# Patient Record
Sex: FEMALE | Race: BLACK OR AFRICAN AMERICAN | URBAN - METROPOLITAN AREA
[De-identification: names, ages, dates, MRNs, and addresses within clinical notes are randomized per-mention and may not be internally consistent; named-entity substitution may affect disease eponyms.]

---

## 2023-07-26 DIAGNOSIS — H40.9 GLAUCOMA, UNSPECIFIED GLAUCOMA TYPE, UNSPECIFIED LATERALITY: Primary | ICD-10-CM

## 2023-11-17 ENCOUNTER — OFFICE VISIT (OUTPATIENT)
Dept: OPHTHALMOLOGY | Facility: CLINIC | Age: 33
End: 2023-11-17
Payer: MEDICAID

## 2023-11-17 DIAGNOSIS — H52.13 MYOPIA OF BOTH EYES: Primary | ICD-10-CM

## 2023-11-17 DIAGNOSIS — H40.9 GLAUCOMA, UNSPECIFIED GLAUCOMA TYPE, UNSPECIFIED LATERALITY: ICD-10-CM

## 2023-11-17 PROCEDURE — 92133 CPTRZD OPH DX IMG PST SGM ON: CPT | Mod: PBBFAC,PN | Performed by: OPHTHALMOLOGY

## 2023-11-17 PROCEDURE — 99213 OFFICE O/P EST LOW 20 MIN: CPT | Mod: PBBFAC,PN

## 2023-11-17 RX ORDER — PRAVASTATIN SODIUM 20 MG/1
20 TABLET ORAL
COMMUNITY
Start: 2023-06-23

## 2023-11-17 RX ORDER — ATORVASTATIN CALCIUM 10 MG/1
10 TABLET, FILM COATED ORAL
COMMUNITY
Start: 2023-10-04

## 2023-11-17 RX ORDER — CLONIDINE HYDROCHLORIDE 0.1 MG/1
TABLET ORAL
COMMUNITY
Start: 2023-10-04

## 2023-11-17 RX ORDER — LATANOPROST 50 UG/ML
1 SOLUTION/ DROPS OPHTHALMIC NIGHTLY
COMMUNITY
Start: 2023-07-20

## 2023-11-17 RX ORDER — METFORMIN HYDROCHLORIDE 750 MG/1
750 TABLET, EXTENDED RELEASE ORAL
COMMUNITY
Start: 2023-10-04

## 2023-11-17 RX ORDER — OMEPRAZOLE 40 MG/1
40 CAPSULE, DELAYED RELEASE ORAL
COMMUNITY
Start: 2023-11-13

## 2023-11-17 RX ORDER — TRANEXAMIC ACID 650 MG/1
650 TABLET ORAL EVERY 8 HOURS PRN
COMMUNITY
Start: 2023-06-16

## 2023-11-17 RX ORDER — LISINOPRIL 20 MG/1
20 TABLET ORAL
COMMUNITY
Start: 2023-02-24

## 2023-11-17 RX ORDER — PHENTERMINE HYDROCHLORIDE 37.5 MG/1
37.5 TABLET ORAL
COMMUNITY
Start: 2023-10-20

## 2023-11-17 RX ORDER — PRAVASTATIN SODIUM 40 MG/1
40 TABLET ORAL
COMMUNITY
Start: 2023-11-06

## 2023-11-17 RX ORDER — AMLODIPINE BESYLATE 10 MG/1
10 TABLET ORAL
COMMUNITY

## 2023-11-17 RX ORDER — VALSARTAN 160 MG/1
160 TABLET ORAL
COMMUNITY
Start: 2023-05-12

## 2023-11-17 RX ORDER — ATORVASTATIN CALCIUM 20 MG/1
20 TABLET, FILM COATED ORAL
COMMUNITY
Start: 2023-06-20

## 2023-11-17 RX ORDER — METFORMIN HYDROCHLORIDE 500 MG/1
500 TABLET ORAL
COMMUNITY
Start: 2023-02-24

## 2023-11-17 RX ORDER — SIMVASTATIN 20 MG/1
20 TABLET, FILM COATED ORAL
COMMUNITY
Start: 2023-02-24

## 2023-11-17 RX ORDER — HYDROXYZINE HYDROCHLORIDE 25 MG/1
25 TABLET, FILM COATED ORAL
COMMUNITY
Start: 2023-09-11

## 2023-11-17 RX ORDER — TRAZODONE HYDROCHLORIDE 50 MG/1
50 TABLET ORAL NIGHTLY PRN
COMMUNITY
Start: 2023-10-23

## 2023-11-17 RX ORDER — METFORMIN HYDROCHLORIDE 500 MG/1
TABLET, EXTENDED RELEASE ORAL
COMMUNITY
Start: 2023-05-23

## 2023-11-17 RX ORDER — METHIMAZOLE 5 MG/1
5 TABLET ORAL 2 TIMES DAILY
COMMUNITY
Start: 2023-10-09

## 2023-11-17 RX ORDER — VALSARTAN AND HYDROCHLOROTHIAZIDE 160; 25 MG/1; MG/1
1 TABLET ORAL
COMMUNITY
Start: 2023-10-16

## 2023-11-17 RX ORDER — IBUPROFEN 600 MG/1
600 TABLET ORAL
COMMUNITY
Start: 2023-11-06

## 2023-11-17 NOTE — PROGRESS NOTES
HPI     Blurred Vision     Additional comments: In both eyes but the pt states it worse in the right   eye. Pt states even when she has her glasses on it still blurry.           Eye Strain     Additional comments: Pt states she strains her eyes more when watching   tv.Pt states she had to lean forward a little to see.           Comments    Glaucoma, unspecified glaucoma type, unspecified laterality          Last edited by Bernice Najera MA on 11/17/2023 10:20 AM.            Assessment /Plan     OCT RNFL 11/17/23  OD: 102, all green  OS: 97, all green    Refractive error  - BCVA 20/20 OU, MRX 11/17/23  - CTM    Glaucoma suspect  - Negative family history  - increased c/d 0.45  - IOP 19//21  - OCT RNFL all green OU  - CTM

## 2024-06-21 ENCOUNTER — TELEPHONE (OUTPATIENT)
Dept: INTERNAL MEDICINE | Facility: CLINIC | Age: 34
End: 2024-06-21
Payer: MEDICAID

## 2025-01-27 ENCOUNTER — TELEPHONE (OUTPATIENT)
Dept: SURGERY | Facility: CLINIC | Age: 35
End: 2025-01-27

## 2025-04-09 NOTE — PROGRESS NOTES
"Initial Consult  Trina Clementina Terrazas  Chief Complaint   Patient presents with    Consult     Interested in VSG ([facility] Medicaid) Dr. ARRIAGA       History of Present Illness:  Patient is a 34 y.o. female who is referred for evaluation of surgical treatment of morbid obesity. Her Body mass index is 41.74 kg/m². She has known comorbidities of diabetes mellitus, GERD, hypertension, and obstructive sleep apnea. She has attended the bariatric seminar and is most interested in gastric sleeve surgery. The patient has had multiple unsuccessful diet attempts in the past without sustained weight loss. With multiple unsuccessful weight loss attempts, the patient believes they are ready for surgical intervention.     Denies any complaints or issues today for visit.    Labs (): need new ones    Menstrual cycle: irregular, monthly  Children: girl, 15 years old. No plans  Occupation: no     Anemia/easy brusing/bleeding - [] Yes   [x] No   Smoker - [] Yes   [x] No  [] Resolved  Hx of blood clots - [] Yes   [x] No     Ht: 5' 4.5" (1.638 m)   Weights:   Trend Weights BMI   Starting 247# 41   Pre-Op     2 Week     2 Month     6 Month      1 Year     2 Year               For Adults 20 Years and Older:  BMI Weight Status   Below 18.5 Underweight   18.6-24.9 Normal/Healthy   25.0-29.9 Overweight   30.0 & Above Obese     Ideal Weight Range for Your Height: 5'4" = 110 - 144 lbs     Pertinent History:  HTN - [x] Yes   [] No    [] Resolved  HLD - [] Yes   [x] No    [] Resolved  DM  -  [] Yes   [x] No    [] Resolved  SYEDA - [x] Yes   [] No   [] Resolved, does not wear CAPA  GERD - [x] Yes   [] No [] Resolved, controlled PPI   Anticoagulation- [] Yes   [x] No      If yes, type: [] ASA [] Plavix [] Other    Patient Care Team:  Lissett Sanchez NP as PCP - General (Family Medicine)  Anoop Ruiz MD as Consulting Physician (Bariatrics)  Maribel Michael MD as Diabetes Digital Medicine Responsible Provider (Urgent Care)  Maribel Michael MD " "as Hypertension Digital Medicine Responsible Provider (Urgent Care)  Octavio Pond II, MD (Cardiology)  Carrie Sherwood NP (Internal Medicine)     Subjective:     12 point review of systems conducted, negative except as stated in the history of present illness. See HPI for details.    Review of patient's allergies indicates:  No Known Allergies  Past Medical History:   Diagnosis Date    GERD (gastroesophageal reflux disease)     Glaucoma     HLD (hyperlipidemia)     Hypertension     Hypothyroidism     Insomnia     Insulin resistance     Migraines     Obesity, unspecified     Obstructive sleep apnea     Vitamin D deficiency      Past Surgical History:   Procedure Laterality Date    BIOPSY OF THYROID       SECTION       Family History   Problem Relation Name Age of Onset    Hypertension Mother Camilla Martins     Anemia Mother Camilla Martins     Diabetes Father Porfirio martins     Heart failure Father Porfirio martins     Hyperlipidemia Father Porfirio martins     Hypertension Father Porfirio martins     Sleep apnea Father Porfirio martins      Social History[1]   Current Outpatient Medications   Medication Instructions    amLODIPine (NORVASC) 10 mg    ergocalciferol, vitamin D2, (VITAMIN D ORAL) Take by mouth.    hydrOXYzine HCL (ATARAX) 25 mg     mg    latanoprost 0.005 % ophthalmic solution 1 drop, Nightly    metFORMIN (GLUCOPHAGE-XR) 750 mg    methIMAzole (TAPAZOLE) 5 mg, 2 times daily    omeprazole (PRILOSEC) 40 mg    valsartan (DIOVAN) 160 mg       Objective:     Vital Signs (Most Recent):  Visit Vitals  BP (!) 137/90   Pulse 74   Ht 5' 4.5" (1.638 m)   Wt 112 kg (247 lb)   BMI 41.74 kg/m²       Physical Exam:  General:  Alert and oriented. Obese  Respiratory:  Lungs are clear to auscultation, Respirations are non-labored, Breath sounds are equal.    Cardiovascular:  Normal rate, Regular rhythm, No murmur.    Gastrointestinal:  Soft, Non-tender, Non-distended, Normal bowel " sounds        Musculoskeletal:  Normal range of motion, Normal strength.    Integumentary:  Warm, Dry, Pink.    Neurologic:  Alert, Oriented.    Psychiatric:  Cooperative.      ASSESSMENT/PLAN:        1. Glaucoma, unspecified glaucoma type, unspecified laterality        2. Primary hypertension        3. Type 2 diabetes mellitus without complication, without long-term current use of insulin        4. Encounter for weight loss counseling        5. Encounter for pre-bariatric surgery counseling and education        6. Exercise counseling        7. Dietary counseling        8. Morbid obesity        9. Wellness examination  CBC Auto Differential    Comprehensive Metabolic Panel    Hemoglobin A1C    Lipid Panel    TSH          Plan:  Trina Terrazas is morbidly obese (Body mass index is 41.74 kg/m².) with significant weight related co-morbidity including: diabetes mellitus, GERD, hypertension, and obstructive sleep apnea. The patient has been unable to lose weight and improve their co-morbid conditions with medical management including diet and exercise. She understands that this is a tool and lifestyle change will be necessary to keep weight off.     RECOMMENDATION: Weight loss surgery. The risks, benefits, and alternatives, including gastric sleeve surgery (which the patient prefers) were discussed at length and all of their questions were answered. The patient appears to understand and wishes to proceed.     Risk of pregnancy discussed with pt's of child bearing age. Avoid pregnancy up to 18 months after bariatric procedure to maximize weight loss and avoid subsequent vitamin and mineral deficiencies and/or possible miscarriage due to low caloric intake.   - no birth control, would like another child.     The patient was given the following instructions:  H.Pylori breat test  Once H.Pylori back will need EGD set up  3 months of medically supervised weight loss visits with the dietitian  Getting [facility]  today.   will need clearance from Dr. Pond, cardiology   Needs psychological evaluations and clearance.  Must attend a preoperative bariatric class.  Discussion with pt regarding steroids and NSAIDs post surgery. Will not be able to use these after surgery due to risk of ulceration, perforation, esophagitis, gastritis, bleeding, etc. Pt verbalized understanding.   The patient clearly understood that surgery would only be scheduled if there were no medical or psychiatric contraindications and a second office visit is required.           [1]   Social History  Tobacco Use    Smoking status: Never    Smokeless tobacco: Never   Substance Use Topics    Alcohol use: Never    Drug use: Never

## 2025-04-10 ENCOUNTER — PATIENT MESSAGE (OUTPATIENT)
Dept: SURGERY | Facility: CLINIC | Age: 35
End: 2025-04-10

## 2025-04-10 ENCOUNTER — OFFICE VISIT (OUTPATIENT)
Dept: SURGERY | Facility: CLINIC | Age: 35
End: 2025-04-10
Payer: MEDICAID

## 2025-04-10 ENCOUNTER — CLINICAL SUPPORT (OUTPATIENT)
Dept: BARIATRICS | Facility: HOSPITAL | Age: 35
End: 2025-04-10

## 2025-04-10 ENCOUNTER — LAB VISIT (OUTPATIENT)
Dept: LAB | Facility: HOSPITAL | Age: 35
End: 2025-04-10
Attending: SURGERY
Payer: MEDICAID

## 2025-04-10 VITALS — HEIGHT: 65 IN | BODY MASS INDEX: 41.26 KG/M2 | WEIGHT: 247.63 LBS

## 2025-04-10 VITALS — BODY MASS INDEX: 41.83 KG/M2 | HEIGHT: 64 IN | WEIGHT: 245 LBS

## 2025-04-10 VITALS
DIASTOLIC BLOOD PRESSURE: 90 MMHG | HEART RATE: 74 BPM | SYSTOLIC BLOOD PRESSURE: 137 MMHG | BODY MASS INDEX: 41.15 KG/M2 | HEIGHT: 65 IN | WEIGHT: 247 LBS

## 2025-04-10 DIAGNOSIS — Z71.3 DIETARY COUNSELING: ICD-10-CM

## 2025-04-10 DIAGNOSIS — Z71.82 EXERCISE COUNSELING: ICD-10-CM

## 2025-04-10 DIAGNOSIS — E66.9 OBESITY: Primary | ICD-10-CM

## 2025-04-10 DIAGNOSIS — Z71.89 ENCOUNTER FOR PRE-BARIATRIC SURGERY COUNSELING AND EDUCATION: ICD-10-CM

## 2025-04-10 DIAGNOSIS — Z00.00 WELLNESS EXAMINATION: ICD-10-CM

## 2025-04-10 DIAGNOSIS — E66.01 MORBID OBESITY: ICD-10-CM

## 2025-04-10 DIAGNOSIS — Z71.3 ENCOUNTER FOR WEIGHT LOSS COUNSELING: ICD-10-CM

## 2025-04-10 DIAGNOSIS — E66.01 MORBID OBESITY WITH BMI OF 40.0-44.9, ADULT: Primary | ICD-10-CM

## 2025-04-10 DIAGNOSIS — I10 PRIMARY HYPERTENSION: ICD-10-CM

## 2025-04-10 DIAGNOSIS — H40.9 GLAUCOMA, UNSPECIFIED GLAUCOMA TYPE, UNSPECIFIED LATERALITY: Primary | ICD-10-CM

## 2025-04-10 DIAGNOSIS — E11.9 TYPE 2 DIABETES MELLITUS WITHOUT COMPLICATION, WITHOUT LONG-TERM CURRENT USE OF INSULIN: ICD-10-CM

## 2025-04-10 LAB
ALBUMIN SERPL-MCNC: 3.9 G/DL (ref 3.5–5)
ALBUMIN/GLOB SERPL: 1 RATIO (ref 1.1–2)
ALP SERPL-CCNC: 100 UNIT/L (ref 40–150)
ALT SERPL-CCNC: 11 UNIT/L (ref 0–55)
ANION GAP SERPL CALC-SCNC: 9 MEQ/L
AST SERPL-CCNC: 16 UNIT/L (ref 11–45)
BASOPHILS # BLD AUTO: 0.02 X10(3)/MCL
BASOPHILS NFR BLD AUTO: 0.3 %
BILIRUB SERPL-MCNC: 0.4 MG/DL
BUN SERPL-MCNC: 6.8 MG/DL (ref 7–18.7)
CALCIUM SERPL-MCNC: 9.3 MG/DL (ref 8.4–10.2)
CHLORIDE SERPL-SCNC: 102 MMOL/L (ref 98–107)
CHOLEST SERPL-MCNC: 282 MG/DL
CHOLEST/HDLC SERPL: 6 {RATIO} (ref 0–5)
CO2 SERPL-SCNC: 27 MMOL/L (ref 22–29)
CREAT SERPL-MCNC: 0.72 MG/DL (ref 0.55–1.02)
CREAT/UREA NIT SERPL: 9
EOSINOPHIL # BLD AUTO: 0.06 X10(3)/MCL (ref 0–0.9)
EOSINOPHIL NFR BLD AUTO: 0.9 %
ERYTHROCYTE [DISTWIDTH] IN BLOOD BY AUTOMATED COUNT: 12.5 % (ref 11.5–17)
EST. AVERAGE GLUCOSE BLD GHB EST-MCNC: 93.9 MG/DL
GFR SERPLBLD CREATININE-BSD FMLA CKD-EPI: >60 ML/MIN/1.73/M2
GLOBULIN SER-MCNC: 3.9 GM/DL (ref 2.4–3.5)
GLUCOSE SERPL-MCNC: 80 MG/DL (ref 74–100)
HBA1C MFR BLD: 4.9 %
HCT VFR BLD AUTO: 37.6 % (ref 37–47)
HDLC SERPL-MCNC: 45 MG/DL (ref 35–60)
HGB BLD-MCNC: 13 G/DL (ref 12–16)
IMM GRANULOCYTES # BLD AUTO: 0.05 X10(3)/MCL (ref 0–0.04)
IMM GRANULOCYTES NFR BLD AUTO: 0.8 %
LDLC SERPL CALC-MCNC: 216 MG/DL (ref 50–140)
LYMPHOCYTES # BLD AUTO: 2.15 X10(3)/MCL (ref 0.6–4.6)
LYMPHOCYTES NFR BLD AUTO: 33.6 %
MCH RBC QN AUTO: 30.1 PG (ref 27–31)
MCHC RBC AUTO-ENTMCNC: 34.6 G/DL (ref 33–36)
MCV RBC AUTO: 87 FL (ref 80–94)
MONOCYTES # BLD AUTO: 0.61 X10(3)/MCL (ref 0.1–1.3)
MONOCYTES NFR BLD AUTO: 9.5 %
NEUTROPHILS # BLD AUTO: 3.5 X10(3)/MCL (ref 2.1–9.2)
NEUTROPHILS NFR BLD AUTO: 54.9 %
NRBC BLD AUTO-RTO: 0 %
PLATELET # BLD AUTO: 303 X10(3)/MCL (ref 130–400)
PMV BLD AUTO: 9.8 FL (ref 7.4–10.4)
POTASSIUM SERPL-SCNC: 3.8 MMOL/L (ref 3.5–5.1)
PROT SERPL-MCNC: 7.8 GM/DL (ref 6.4–8.3)
RBC # BLD AUTO: 4.32 X10(6)/MCL (ref 4.2–5.4)
SODIUM SERPL-SCNC: 138 MMOL/L (ref 136–145)
TRIGL SERPL-MCNC: 106 MG/DL (ref 37–140)
TSH SERPL-ACNC: 0.28 UIU/ML (ref 0.35–4.94)
VLDLC SERPL CALC-MCNC: 21 MG/DL
WBC # BLD AUTO: 6.39 X10(3)/MCL (ref 4.5–11.5)

## 2025-04-10 PROCEDURE — 3008F BODY MASS INDEX DOCD: CPT | Mod: CPTII,,, | Performed by: NURSE PRACTITIONER

## 2025-04-10 PROCEDURE — 83036 HEMOGLOBIN GLYCOSYLATED A1C: CPT

## 2025-04-10 PROCEDURE — 3080F DIAST BP >= 90 MM HG: CPT | Mod: CPTII,,, | Performed by: NURSE PRACTITIONER

## 2025-04-10 PROCEDURE — 80053 COMPREHEN METABOLIC PANEL: CPT

## 2025-04-10 PROCEDURE — 84443 ASSAY THYROID STIM HORMONE: CPT

## 2025-04-10 PROCEDURE — 85025 COMPLETE CBC W/AUTO DIFF WBC: CPT

## 2025-04-10 PROCEDURE — 80061 LIPID PANEL: CPT

## 2025-04-10 PROCEDURE — 1159F MED LIST DOCD IN RCRD: CPT | Mod: CPTII,,, | Performed by: NURSE PRACTITIONER

## 2025-04-10 PROCEDURE — 3075F SYST BP GE 130 - 139MM HG: CPT | Mod: CPTII,,, | Performed by: NURSE PRACTITIONER

## 2025-04-10 PROCEDURE — 99205 OFFICE O/P NEW HI 60 MIN: CPT | Mod: ,,, | Performed by: NURSE PRACTITIONER

## 2025-04-10 PROCEDURE — 36415 COLL VENOUS BLD VENIPUNCTURE: CPT

## 2025-04-10 NOTE — PROGRESS NOTES
BEHAVIOR MODIFICATION AND EXERCISE CONSULT    PERSONAL:     What initiated your interest in bariatric surgery? Insulin resistant, tried to lose weight      Marital Status: [x]Single   []   []   []      Do you have children? 1 (15)    Do you have childcare issues?    What is your highest level of education completed? 11th grade    Do you have any difficulty with reading and writing? no    Who is your emotion support? mom    Do you work? []Yes   [x]No   []Disabled   []Retired    If yes, what is your occupation?     Do you enjoy your work?     Were there any particular events that lead to significant weight gain? []Loss of a loved one  []Pregnancy  []Trauma, accident, illness  []Loss of employment [x]Other    PHYSICAL ACTIVITY:    Do you currently exercise: []Yes   [x]No    If so, please describe: tries to do in home exercises, stretching     Have you experienced any injuries and/or restrictions that may limit your physical activity? [x]Yes   []No    If so, please describe: claudication     BEHAVIORS:    What behavior(s) would you like to change in order to be healthy? Eating so much sweets, exercise    On a scale of 1-10 (1-extremely low, 10-extremely high), how motivated are you to change your behavior(s)? 10    Do you currently use any type of tobacco products (vape, dip, cigarettes, etc.) No    If yes, on average, how many and/or how often do you use these products on a daily basis?    How many hours of sleep do you average? 7    On average, rate your stress level on a scale of 1-10 (1-extremely low, 10-extremely high) 5    What is your biggest life stressor? When someone tells bad news    How do you cope and/or manage stress? Read or talk with mom, relax    Is your appetite affected by stress, boredom, depression, etc.? no    Have you ever seen a counselor or therapist? No     CURRENT WEIGHT: 247.6 HIGHEST WEIGHT: 275 LOWEST ADULT WEIGHT: 209 GOAL WEIGHT: 160    WAIST/HIP:  50/51.5    HANDOUTS: preop booklet    NOTES: body composition was conducted and patient was educated on results.       JEFFREY Savage, CPT, CHC

## 2025-04-10 NOTE — PROGRESS NOTES
"NUTRITIONAL CONSULT    Initial assessment for sleeve gastrectomy  Non Surgical: []    PAST HISTORY:   Dieting attempts include low fat, low calorie diet  Highest adult weight: 275#  How many years at present wt: 2 years around 245-275#s  Greatest single wt loss: 30#s from no fast food     CLINICAL DATA:  Height:   Ht Readings from Last 1 Encounters:   04/10/25 5' 4" (1.626 m)      Weight:   Wt Readings from Last 3 Encounters:   04/10/25 1141 111.1 kg (245 lb)      IBW: 120 lbs   BMI:   BMI Readings from Last 1 Encounters:   04/10/25 42.05 kg/m²      Bariatric goal weight (125% EBW): 150 lbs  Patient's goal weight: 150 lbs    FAMILY HISTORY OF OBESITY:  Yes           WHAT SIDE OF THE FAMILY?   [x]Mother   [x]Father   []Sibling   []Child     [x]Extended    []Adopted     Goal for Bariatric Surgery: to improve health, to improve quality of life, to lose weight, and to prevent future medical conditions    NUTRITION & HEALTH HISTORY:  Greatest challenge: sweets instead of dinner, sodas (5) insulin resistant , Boredom eating when watching TV or wake up in middle night and eat     Current diet recall:   Breakfast- cereal pops with milk  Lunch: pizza personal size with stuffed crust  2 little debbies and 1 snickers  Dinner: skipped    Current Dietary Patterns:  Meal pattern: 2- sweets for dinner   Snackin-3 / day Type: instead of dinner does sweets- chips candy sweets pier   Vegetables: Likes a variety. Eats 2-3 times per week.  Fruits: Likes a variety. Eats daily. Grapes daily  Beverages: soda, sugary beverages, and cranberry juice, Powerade, water sometimes  Alcohol consumption: Never. Type:   Dining out: Weekly. Mostly fast food and restaurants. - cut back couple years ago  Grocery shopping and food prep: Yes[]Self   []Spouse   [x]Other: father does cooking  Emotional eating: Yes Which emotions if yes: sometimes if upset like bad news- worry, does eat out of boredom when watching tv  Nighttime snacking: Yes Middle of " night: Yes Before bedtime: Yes  Hx of disordered eating behaviors: No Anorexia: No Bulimia: No Purging: No Binging:No  History of vitamin/mineral deficiencies:   Yes, Vit D    Vitamins / Minerals / Herbs:   Vit D    Food Allergies:   None     ASSESSMENT:  Patient reports attempts at weight loss, only to regain lost weight.  Patient demonstrated knowledge of healthy eating behaviors and exercise patterns; admits to not eating healthy and not exercising at this point.  Patient states willingness to change lifestyle and make behavior modifications.  Expect good  compliance after surgery at this time.    ESTIMATED NEEDS: 68 kg  Calories: 5493-9599 (20-25 kcal/kg adjusted BW/d)  Protein: 68-75 (1.0-1.1 g/kg adjusted BW/d)  Fluid:  1360 (20 mL/kg actual BW/d)    BARIATRIC DIET DISCUSSION:  Discussed diet after surgery and related to patients food record.  Reviewed diet progression before and after surgery.  Reinforced that surgery is not a magic bullet and importance of low fat foods and no snacking.  Answered all questions.    RECOMMENDATIONS:  Patient is a good candidate for bariatric surgery.    PLAN:  Work on Bariatric Nutrition Checklist.  Work on expanding variety of vegetables.  Work on gradually cutting back on starchy CHO in the diet.  5-6 meals per day.  Switch to SF beverages  Reduce sweets/refined carb consumption

## 2025-04-10 NOTE — H&P
"Initial Consult  Trina Clementina Terrazas  Chief Complaint   Patient presents with    Consult     Interested in VSG ([facility] Medicaid) Dr. ARRIAGA       History of Present Illness:  Patient is a 34 y.o. female who is referred for evaluation of surgical treatment of morbid obesity. Her Body mass index is 41.74 kg/m². She has known comorbidities of diabetes mellitus, GERD, hypertension, and obstructive sleep apnea. She has attended the bariatric seminar and is most interested in gastric sleeve surgery. The patient has had multiple unsuccessful diet attempts in the past without sustained weight loss. With multiple unsuccessful weight loss attempts, the patient believes they are ready for surgical intervention.     Denies any complaints or issues today for visit.    Labs (): need new ones    Menstrual cycle: irregular, monthly  Children: girl, 15 years old. No plans  Occupation: no     Anemia/easy brusing/bleeding - [] Yes   [x] No   Smoker - [] Yes   [x] No  [] Resolved  Hx of blood clots - [] Yes   [x] No     Ht: 5' 4.5" (1.638 m)   Weights:   Trend Weights BMI   Starting 247# 41   Pre-Op     2 Week     2 Month     6 Month      1 Year     2 Year               For Adults 20 Years and Older:  BMI Weight Status   Below 18.5 Underweight   18.6-24.9 Normal/Healthy   25.0-29.9 Overweight   30.0 & Above Obese     Ideal Weight Range for Your Height: 5'4" = 110 - 144 lbs     Pertinent History:  HTN - [x] Yes   [] No    [] Resolved  HLD - [] Yes   [x] No    [] Resolved  DM  -  [] Yes   [x] No    [] Resolved  SYEDA - [x] Yes   [] No   [] Resolved, does not wear CAPA  GERD - [x] Yes   [] No [] Resolved, controlled PPI   Anticoagulation- [] Yes   [x] No      If yes, type: [] ASA [] Plavix [] Other    Patient Care Team:  Lissett Sanchez NP as PCP - General (Family Medicine)  Anoop Ruiz MD as Consulting Physician (Bariatrics)  Maribel Michael MD as Diabetes Digital Medicine Responsible Provider (Urgent Care)  Maribel Michael MD " "as Hypertension Digital Medicine Responsible Provider (Urgent Care)  Octavio Pond II, MD (Cardiology)  Carrie Sherwood NP (Internal Medicine)     Subjective:     12 point review of systems conducted, negative except as stated in the history of present illness. See HPI for details.    Review of patient's allergies indicates:  No Known Allergies  Past Medical History:   Diagnosis Date    GERD (gastroesophageal reflux disease)     Glaucoma     HLD (hyperlipidemia)     Hypertension     Hypothyroidism     Insomnia     Insulin resistance     Migraines     Obesity, unspecified     Obstructive sleep apnea     Vitamin D deficiency      Past Surgical History:   Procedure Laterality Date    BIOPSY OF THYROID       SECTION       Family History   Problem Relation Name Age of Onset    Hypertension Mother Camilla Martins     Anemia Mother Camilla Martins     Diabetes Father Porfirio martins     Heart failure Father Porfirio martins     Hyperlipidemia Father Porfirio martins     Hypertension Father Porfirio martins     Sleep apnea Father Porfirio martins      [Social History]     [Social History]  Tobacco Use    Smoking status: Never    Smokeless tobacco: Never   Substance Use Topics    Alcohol use: Never    Drug use: Never     Current Outpatient Medications   Medication Instructions    amLODIPine (NORVASC) 10 mg    ergocalciferol, vitamin D2, (VITAMIN D ORAL) Take by mouth.    hydrOXYzine HCL (ATARAX) 25 mg     mg    latanoprost 0.005 % ophthalmic solution 1 drop, Nightly    metFORMIN (GLUCOPHAGE-XR) 750 mg    methIMAzole (TAPAZOLE) 5 mg, 2 times daily    omeprazole (PRILOSEC) 40 mg    valsartan (DIOVAN) 160 mg       Objective:     Vital Signs (Most Recent):  Visit Vitals  BP (!) 137/90   Pulse 74   Ht 5' 4.5" (1.638 m)   Wt 112 kg (247 lb)   BMI 41.74 kg/m²       Physical Exam:  General:  Alert and oriented. Obese  Respiratory:  Lungs are clear to auscultation, Respirations are non-labored, Breath " sounds are equal.    Cardiovascular:  Normal rate, Regular rhythm, No murmur.    Gastrointestinal:  Soft, Non-tender, Non-distended, Normal bowel sounds        Musculoskeletal:  Normal range of motion, Normal strength.    Integumentary:  Warm, Dry, Pink.    Neurologic:  Alert, Oriented.    Psychiatric:  Cooperative.      ASSESSMENT/PLAN:        1. Glaucoma, unspecified glaucoma type, unspecified laterality        2. Primary hypertension        3. Type 2 diabetes mellitus without complication, without long-term current use of insulin        4. Encounter for weight loss counseling        5. Encounter for pre-bariatric surgery counseling and education        6. Exercise counseling        7. Dietary counseling        8. Morbid obesity        9. Wellness examination  CBC Auto Differential    Comprehensive Metabolic Panel    Hemoglobin A1C    Lipid Panel    TSH          Plan:  Trina Terrazas is morbidly obese (Body mass index is 41.74 kg/m².) with significant weight related co-morbidity including: diabetes mellitus, GERD, hypertension, and obstructive sleep apnea. The patient has been unable to lose weight and improve their co-morbid conditions with medical management including diet and exercise. She understands that this is a tool and lifestyle change will be necessary to keep weight off.     RECOMMENDATION: Weight loss surgery. The risks, benefits, and alternatives, including gastric sleeve surgery (which the patient prefers) were discussed at length and all of their questions were answered. The patient appears to understand and wishes to proceed.     Risk of pregnancy discussed with pt's of child bearing age. Avoid pregnancy up to 18 months after bariatric procedure to maximize weight loss and avoid subsequent vitamin and mineral deficiencies and/or possible miscarriage due to low caloric intake.   - no birth control, would like another child.     The patient was given the following instructions:  H.Pylori  breat test  Once H.Pylori back will need EGD set up  3 months of medically supervised weight loss visits with the dietitian  Getting labs from [facility] today.   will need clearance from Dr. Pond, cardiology   Needs psychological evaluations and clearance.  Must attend a preoperative bariatric class.  Discussion with pt regarding steroids and NSAIDs post surgery. Will not be able to use these after surgery due to risk of ulceration, perforation, esophagitis, gastritis, bleeding, etc. Pt verbalized understanding.   The patient clearly understood that surgery would only be scheduled if there were no medical or psychiatric contraindications and a second office visit is required.

## 2025-04-11 ENCOUNTER — TELEPHONE (OUTPATIENT)
Dept: SURGERY | Facility: CLINIC | Age: 35
End: 2025-04-11
Payer: MEDICAID

## 2025-04-11 ENCOUNTER — RESULTS FOLLOW-UP (OUTPATIENT)
Dept: SURGERY | Facility: CLINIC | Age: 35
End: 2025-04-11

## 2025-04-11 NOTE — LETTER
This communication is flagged as high priority.      April 11, 2025    Trina Terrazas  [address] [place] [id number]             [facility] Surgical - General Surgery Services  [address] [address]  Phone: [phone]  Fax: [phone] Dear Brandy Galan:    Below are the results from your recent visit:    Resulted Orders   Comprehensive Metabolic Panel   Result Value Ref Range    Sodium 138 136 - 145 mmol/L    Potassium 3.8 3.5 - 5.1 mmol/L    Chloride 102 98 - 107 mmol/L    CO2 27 22 - 29 mmol/L    Glucose 80 74 - 100 mg/dL    Blood Urea Nitrogen 6.8 (L) 7.0 - 18.7 mg/dL    Creatinine 0.72 0.55 - 1.02 mg/dL    Calcium 9.3 8.4 - 10.2 mg/dL    Protein Total 7.8 6.4 - 8.3 gm/dL    Albumin 3.9 3.5 - 5.0 g/dL    Globulin 3.9 (H) 2.4 - 3.5 gm/dL    Albumin/Globulin Ratio 1.0 (L) 1.1 - 2.0 ratio    Bilirubin Total 0.4 <=1.5 mg/dL     40 - 150 unit/L    ALT 11 0 - 55 unit/L    AST 16 11 - 45 unit/L    eGFR >60 mL/min/1.73/m2      Comment:      Estimated GFR calculated using the CKD-EPI creatinine (2021) equation.    Anion Gap 9.0 mEq/L    BUN/Creatinine Ratio 9    Hemoglobin A1C   Result Value Ref Range    Hemoglobin A1c 4.9 <=7.0 %    Estimated Average Glucose 93.9 mg/dL   Lipid Panel   Result Value Ref Range    Cholesterol Total 282 (H) <=200 mg/dL    HDL Cholesterol 45 35 - 60 mg/dL    Triglyceride 106 37 - 140 mg/dL    Cholesterol/HDL Ratio 6 (H) 0 - 5    Very Low Density Lipoprotein 21     LDL Cholesterol 216.00 (H) 50.00 - 140.00 mg/dL      Comment:      LDL calculated using the Friedewald equation.   TSH   Result Value Ref Range    TSH 0.284 (L) 0.350 - 4.940 uIU/mL   CBC with Differential   Result Value Ref Range    WBC 6.39 4.50 - 11.50 x10(3)/mcL    RBC 4.32 4.20 - 5.40 x10(6)/mcL    Hgb 13.0 12.0 - 16.0 g/dL    Hct 37.6 37.0 - 47.0 %    MCV 87.0 80.0 - 94.0 fL    MCH 30.1 27.0 - 31.0 pg    MCHC 34.6 33.0 - 36.0 g/dL    RDW  12.5 11.5 - 17.0 %    Platelet 303 130 - 400 x10(3)/mcL    MPV 9.8 7.4 - 10.4 fL    Neut % 54.9 %    Lymph % 33.6 %    Mono % 9.5 %    Eos % 0.9 %    Basophil % 0.3 %    Imm Grans % 0.8 %    Neut # 3.50 2.1 - 9.2 x10(3)/mcL    Lymph # 2.15 0.6 - 4.6 x10(3)/mcL    Mono # 0.61 0.1 - 1.3 x10(3)/mcL    Eos # 0.06 0 - 0.9 x10(3)/mcL    Baso # 0.02 <=0.2 x10(3)/mcL    Imm Gran # 0.05 (H) 0.00 - 0.04 x10(3)/mcL    NRBC% 0.0 %     Please contact our patient to discuss her low TSH and elevated cholesterol.         Sincerely,        Latosha Kam, ROLANDOP

## 2025-04-11 NOTE — TELEPHONE ENCOUNTER
----- Message from Jerry Valenzuela sent at 4/10/2025  2:07 PM CDT -----  1. FU on lab results - pt going to [facility] 4/10/25 send to me and AC

## 2025-04-11 NOTE — PROGRESS NOTES
Medicaid bcon for G  Please send results to pt's PCP regarding her low TSH and elevated cholesterol.

## 2025-05-09 ENCOUNTER — LAB VISIT (OUTPATIENT)
Dept: LAB | Facility: HOSPITAL | Age: 35
End: 2025-05-09
Attending: EMERGENCY MEDICINE
Payer: MEDICAID

## 2025-05-09 DIAGNOSIS — E11.9 TYPE 2 DIABETES MELLITUS WITHOUT COMPLICATION, WITHOUT LONG-TERM CURRENT USE OF INSULIN: ICD-10-CM

## 2025-05-09 LAB
CREAT UR-MCNC: 337.2 MG/DL (ref 45–106)
EST. AVERAGE GLUCOSE BLD GHB EST-MCNC: 91.1 MG/DL
HBA1C MFR BLD: 4.8 %
MICROALBUMIN UR-MCNC: 19.1 UG/ML
MICROALBUMIN/CREAT RATIO PNL UR: 5.7 MG/GM CR (ref 0–30)

## 2025-05-09 PROCEDURE — 82043 UR ALBUMIN QUANTITATIVE: CPT

## 2025-05-09 PROCEDURE — 83036 HEMOGLOBIN GLYCOSYLATED A1C: CPT

## 2025-05-09 PROCEDURE — 36415 COLL VENOUS BLD VENIPUNCTURE: CPT

## 2025-05-14 ENCOUNTER — CLINICAL SUPPORT (OUTPATIENT)
Dept: BARIATRICS | Facility: HOSPITAL | Age: 35
End: 2025-05-14

## 2025-05-14 VITALS — BODY MASS INDEX: 40.65 KG/M2 | HEIGHT: 65 IN | WEIGHT: 244 LBS

## 2025-05-14 DIAGNOSIS — E66.01 MORBID OBESITY WITH BMI OF 40.0-44.9, ADULT: Primary | ICD-10-CM

## 2025-05-14 PROCEDURE — 94200034 HC BARIATRIC NUTRITIONAL SVCS PT GRADE I: Performed by: DIETITIAN, REGISTERED

## 2025-05-14 NOTE — PROGRESS NOTES
"Patient Education [x] MSWL Visit Number: 1/3     []  Pre-op Wt Loss Goal (as ordered on referral):   [] NSWL Visit:     Height:   Ht Readings from Last 1 Encounters:   05/14/25 5' 4.5" (1.638 m)      Weight:   Wt Readings from Last 3 Encounters:   05/14/25 0934 110.7 kg (244 lb)   04/10/25 1407 112.3 kg (247 lb 9.6 oz)   04/10/25 1304 112 kg (247 lb)      BMI:   BMI Readings from Last 1 Encounters:   05/14/25 41.24 kg/m²                                                                          Barriers to learning:  [x]None evident  []Acuity of illness  []Cognitive defects  []Cultural barriers  []Desire/Motivation  []Difficulty concentrating  []Emotional state  []Financial concerns  []Hearing deficit  []Language barrier  []Literacy  []Memory problems  []Vision impairment     Home caregiver present for session   []YES  [x] NO       Teaching methods:  []Demonstration  [x]Explanation  [x]Printed materials  []Teach back  []Virtual/web based         Verbalizes understanding Demonstrates  Needs further teaching Needs practice/ supervision Comments    Bariatric Surgery Diet  [] [] [] []    Clear liquid [] [] [] []    Full liquid [] [] [] []    Weight Reduction [x] [] [] []    Other Diet [] [] [] []          Additional Learner (s) Present                                                                  []Spouse   []Daughter    []  Son  []Family member   []Friend   []Grandfather   []Grandmother   []Father   []Mother   []Other       Expected Compliance:  [x]Good  []Fair  []Poor    Additional Information:    Pt returns to clinic for MSWL 1/3. Pt has lost 3# since consult. Pt has reduced sweets, sodas, and drinking more water.     24 hr recall:  B-dry cereal  L- sausage sandwich, hamburger  S- skipped  Bevs- water, SF flavored water    Plan:  Protein with each meal; discussed appropriate options.  Healthful snacks when more than 5 hours between meals; discussed appropriate options.  Daily practice of bariatric appropriate eating " behaviors.

## 2025-06-25 ENCOUNTER — CLINICAL SUPPORT (OUTPATIENT)
Dept: BARIATRICS | Facility: HOSPITAL | Age: 35
End: 2025-06-25

## 2025-06-25 VITALS — BODY MASS INDEX: 40.32 KG/M2 | WEIGHT: 242 LBS | HEIGHT: 65 IN

## 2025-06-25 DIAGNOSIS — E66.01 MORBID OBESITY WITH BMI OF 40.0-44.9, ADULT: Primary | ICD-10-CM

## 2025-06-25 PROCEDURE — 94200034 HC BARIATRIC NUTRITIONAL SVCS PT GRADE I: Performed by: DIETITIAN, REGISTERED

## 2025-06-25 NOTE — PROGRESS NOTES
"Patient Education [x] MSWL Visit Number: MSWL 2/3     []  Pre-op Wt Loss Goal (as ordered on referral):   [] NSWL Visit:     Height:   Ht Readings from Last 1 Encounters:   06/25/25 5' 4.5" (1.638 m)      Weight:   Wt Readings from Last 3 Encounters:   06/25/25 1513 109.8 kg (242 lb)   05/14/25 0934 110.7 kg (244 lb)   04/10/25 1407 112.3 kg (247 lb 9.6 oz)      BMI:   BMI Readings from Last 1 Encounters:   06/25/25 40.90 kg/m²                                                                          Barriers to learning:  [x]None evident  []Acuity of illness  []Cognitive defects  []Cultural barriers  []Desire/Motivation  []Difficulty concentrating  []Emotional state  []Financial concerns  []Hearing deficit  []Language barrier  []Literacy  []Memory problems  []Vision impairment     Home caregiver present for session   []YES  [x] NO       Teaching methods:  []Demonstration  [x]Explanation  []Printed materials  []Teach back  []Virtual/web based         Verbalizes understanding Demonstrates  Needs further teaching Needs practice/ supervision Comments    Bariatric Surgery Diet  [] [] [] []    Clear liquid [] [] [] []    Full liquid [] [] [] []    Weight Reduction [x] [] [] []    Other Diet [] [] [] []          Additional Learner (s) Present                                                                  []Spouse   []Daughter    []  Son  []Family member   []Friend   []Grandfather   []Grandmother   []Father   []Mother   []Other       Expected Compliance:  [x]Good  []Fair  []Poor    Additional Information:    Pt returns to clinic for MSWL 2/3 and has lost another 2#. Pt states she is prioritizing protein at meals, but that she does occasionally skips a meal. She is drinking mainly water and monitoring her portion sizes at meal times. She has found a protein shake that she likes.     Plan:  3 meals per day, each with a protein. Utilize protein shake if needed (bring shake to next appt for review of nutritional " label).  Increase exercise with 20 mins waking daily.  Continue leaner protein sources and water intake.

## 2025-07-03 ENCOUNTER — PATIENT MESSAGE (OUTPATIENT)
Dept: SURGERY | Facility: CLINIC | Age: 35
End: 2025-07-03
Payer: MEDICAID

## 2025-07-22 ENCOUNTER — TELEPHONE (OUTPATIENT)
Dept: SURGERY | Facility: CLINIC | Age: 35
End: 2025-07-22
Payer: MEDICAID

## 2025-07-23 ENCOUNTER — LAB VISIT (OUTPATIENT)
Dept: LAB | Facility: HOSPITAL | Age: 35
End: 2025-07-23
Attending: SURGERY
Payer: MEDICAID

## 2025-07-23 ENCOUNTER — CLINICAL SUPPORT (OUTPATIENT)
Dept: BARIATRICS | Facility: HOSPITAL | Age: 35
End: 2025-07-23

## 2025-07-23 VITALS — BODY MASS INDEX: 39.97 KG/M2 | HEIGHT: 65 IN | WEIGHT: 239.88 LBS

## 2025-07-23 DIAGNOSIS — E66.01 MORBID OBESITY WITH BMI OF 40.0-44.9, ADULT: Primary | ICD-10-CM

## 2025-07-23 DIAGNOSIS — E66.01 MORBID OBESITY: Primary | ICD-10-CM

## 2025-07-23 DIAGNOSIS — E66.01 MORBID OBESITY: ICD-10-CM

## 2025-07-23 PROCEDURE — 94200034 HC BARIATRIC NUTRITIONAL SVCS PT GRADE I: Performed by: DIETITIAN, REGISTERED

## 2025-07-23 PROCEDURE — 83013 H PYLORI (C-13) BREATH: CPT

## 2025-07-23 NOTE — PROGRESS NOTES
"Patient Education [x] MSWL Visit Number: 3/3     []  Pre-op Wt Loss Goal (as ordered on referral):   [] NSWL Visit:     Height:   Ht Readings from Last 1 Encounters:   07/23/25 5' 4.5" (1.638 m)      Weight:   Wt Readings from Last 3 Encounters:   07/23/25 1101 108.8 kg (239 lb 14.4 oz)   06/25/25 1513 109.8 kg (242 lb)   05/14/25 0934 110.7 kg (244 lb)      BMI:   BMI Readings from Last 1 Encounters:   07/23/25 40.54 kg/m²                                                                          Barriers to learning:  [x]None evident  []Acuity of illness  []Cognitive defects  []Cultural barriers  []Desire/Motivation  []Difficulty concentrating  []Emotional state  []Financial concerns  []Hearing deficit  []Language barrier  []Literacy  []Memory problems  []Vision impairment     Home caregiver present for session   []YES  [x] NO       Teaching methods:  []Demonstration  [x]Explanation  []Printed materials  []Teach back  []Virtual/web based         Verbalizes understanding Demonstrates  Needs further teaching Needs practice/ supervision Comments    Bariatric Surgery Diet  [] [] [] []    Clear liquid [] [] [] []    Full liquid [] [] [] []    Weight Reduction [x] [] [] []    Other Diet [] [] [] []          Additional Learner (s) Present                                                                  []Spouse   []Daughter    []  Son  []Family member   []Friend   []Grandfather   []Grandmother   []Father   []Mother   []Other       Expected Compliance:  [x]Good  []Fair  []Poor    Additional Information:    Pt returns to clinic for MSWL 3/3. Pt has lost another 3# since last visit, 6# since start of program. Pt has reduced sugar intake in snacks and beverages and is snacking less in general. Pt is prioritizing protein at all meals.    24 hr recall:  B- 3 boiled eggs  L- roasted ham sandwich, no cheese or magana  S- Lean Cuisine turkey meal  Sn- fruit, P3 pack, beef jerky  Bevs- water with and without SF flavoring    Plan:   1. " Continue current meal plan.  2. Continue to avoid sugar rich snacks and beverages.  3. Daily practice with bariatric eating behaviors.

## 2025-07-24 LAB — UREA BREATH TEST QL: NEGATIVE

## 2025-07-28 ENCOUNTER — TELEPHONE (OUTPATIENT)
Dept: SURGERY | Facility: CLINIC | Age: 35
End: 2025-07-28
Payer: MEDICAID

## 2025-07-29 DIAGNOSIS — E66.01 MORBID OBESITY: Primary | ICD-10-CM

## 2025-07-29 DIAGNOSIS — K21.9 GASTROESOPHAGEAL REFLUX DISEASE, UNSPECIFIED WHETHER ESOPHAGITIS PRESENT: Primary | ICD-10-CM

## 2025-07-29 NOTE — TELEPHONE ENCOUNTER
Both case request are in     Looks like pt called yesterday for Hpylori results.... those are negative.    Lily can you call the pt and let her know please. Thanks     The patient does have cardiac clearance and is a low risk for surgery. No blood thinners to hold

## 2025-08-13 PROBLEM — E66.01 MORBID OBESITY WITH BMI OF 40.0-44.9, ADULT: Status: ACTIVE | Noted: 2025-08-13

## 2025-08-15 ENCOUNTER — ANESTHESIA (OUTPATIENT)
Facility: HOSPITAL | Age: 35
End: 2025-08-15
Payer: MEDICAID

## 2025-08-15 ENCOUNTER — HOSPITAL ENCOUNTER (OUTPATIENT)
Facility: HOSPITAL | Age: 35
Discharge: HOME | End: 2025-08-15
Attending: SURGERY | Admitting: SURGERY
Payer: MEDICAID

## 2025-08-15 ENCOUNTER — ANESTHESIA EVENT (OUTPATIENT)
Facility: HOSPITAL | Age: 35
End: 2025-08-15
Payer: MEDICAID

## 2025-08-15 DIAGNOSIS — E66.01 MORBID OBESITY WITH BMI OF 40.0-44.9, ADULT: ICD-10-CM

## 2025-08-15 LAB
B-HCG UR QL: NEGATIVE
CTP QC/QA: YES
POCT GLUCOSE: 82 MG/DL (ref 70–110)

## 2025-08-15 PROCEDURE — 37000009 HC ANESTHESIA EA ADD 15 MINS: Performed by: SURGERY

## 2025-08-15 PROCEDURE — 37000008 HC ANESTHESIA 1ST 15 MINUTES: Performed by: SURGERY

## 2025-08-15 PROCEDURE — 63600175 PHARM REV CODE 636 W HCPCS

## 2025-08-15 PROCEDURE — 43235 EGD DIAGNOSTIC BRUSH WASH: CPT | Performed by: SURGERY

## 2025-08-15 RX ORDER — KETOROLAC TROMETHAMINE 30 MG/ML
15 INJECTION, SOLUTION INTRAMUSCULAR; INTRAVENOUS EVERY 8 HOURS PRN
Status: DISCONTINUED | OUTPATIENT
Start: 2025-08-15 | End: 2025-08-15 | Stop reason: HOSPADM

## 2025-08-15 RX ORDER — MIDAZOLAM HYDROCHLORIDE 2 MG/2ML
2 INJECTION, SOLUTION INTRAMUSCULAR; INTRAVENOUS
Status: DISCONTINUED | OUTPATIENT
Start: 2025-08-15 | End: 2025-08-15 | Stop reason: HOSPADM

## 2025-08-15 RX ORDER — HYDROMORPHONE HYDROCHLORIDE 2 MG/ML
0.2 INJECTION, SOLUTION INTRAMUSCULAR; INTRAVENOUS; SUBCUTANEOUS EVERY 5 MIN PRN
Status: DISCONTINUED | OUTPATIENT
Start: 2025-08-15 | End: 2025-08-15 | Stop reason: HOSPADM

## 2025-08-15 RX ORDER — GLUCAGON 1 MG
1 KIT INJECTION
Status: DISCONTINUED | OUTPATIENT
Start: 2025-08-15 | End: 2025-08-15 | Stop reason: HOSPADM

## 2025-08-15 RX ORDER — OXYCODONE AND ACETAMINOPHEN 5; 325 MG/1; MG/1
1 TABLET ORAL
Status: DISCONTINUED | OUTPATIENT
Start: 2025-08-15 | End: 2025-08-15 | Stop reason: HOSPADM

## 2025-08-15 RX ORDER — LIDOCAINE HYDROCHLORIDE 20 MG/ML
INJECTION, SOLUTION EPIDURAL; INFILTRATION; INTRACAUDAL; PERINEURAL
Status: DISCONTINUED | OUTPATIENT
Start: 2025-08-15 | End: 2025-08-15

## 2025-08-15 RX ORDER — ONDANSETRON HYDROCHLORIDE 2 MG/ML
4 INJECTION, SOLUTION INTRAVENOUS EVERY 4 HOURS PRN
Status: DISCONTINUED | OUTPATIENT
Start: 2025-08-15 | End: 2025-08-15 | Stop reason: HOSPADM

## 2025-08-15 RX ORDER — ACETAMINOPHEN 325 MG/1
650 TABLET ORAL EVERY 6 HOURS PRN
Status: DISCONTINUED | OUTPATIENT
Start: 2025-08-15 | End: 2025-08-15 | Stop reason: HOSPADM

## 2025-08-15 RX ORDER — LABETALOL HCL 20 MG/4 ML
10 SYRINGE (ML) INTRAVENOUS EVERY 6 HOURS PRN
Status: DISCONTINUED | OUTPATIENT
Start: 2025-08-15 | End: 2025-08-15 | Stop reason: HOSPADM

## 2025-08-15 RX ORDER — SODIUM CHLORIDE, SODIUM LACTATE, POTASSIUM CHLORIDE, CALCIUM CHLORIDE 600; 310; 30; 20 MG/100ML; MG/100ML; MG/100ML; MG/100ML
INJECTION, SOLUTION INTRAVENOUS CONTINUOUS
Status: DISCONTINUED | OUTPATIENT
Start: 2025-08-15 | End: 2025-08-15 | Stop reason: HOSPADM

## 2025-08-15 RX ORDER — HALOPERIDOL LACTATE 5 MG/ML
0.5 INJECTION, SOLUTION INTRAMUSCULAR EVERY 10 MIN PRN
Status: DISCONTINUED | OUTPATIENT
Start: 2025-08-15 | End: 2025-08-15 | Stop reason: HOSPADM

## 2025-08-15 RX ORDER — HYDRALAZINE HYDROCHLORIDE 20 MG/ML
10 INJECTION INTRAMUSCULAR; INTRAVENOUS ONCE
Status: DISCONTINUED | OUTPATIENT
Start: 2025-08-15 | End: 2025-08-15 | Stop reason: HOSPADM

## 2025-08-15 RX ORDER — PROPOFOL 10 MG/ML
VIAL (ML) INTRAVENOUS
Status: DISCONTINUED | OUTPATIENT
Start: 2025-08-15 | End: 2025-08-15

## 2025-08-15 RX ADMIN — SODIUM CHLORIDE, POTASSIUM CHLORIDE, SODIUM LACTATE AND CALCIUM CHLORIDE: 600; 310; 30; 20 INJECTION, SOLUTION INTRAVENOUS at 09:08

## 2025-08-15 RX ADMIN — PROPOFOL 70 MG: 10 INJECTION, EMULSION INTRAVENOUS at 09:08

## 2025-08-15 RX ADMIN — LIDOCAINE HYDROCHLORIDE 40 MG: 20 INJECTION, SOLUTION EPIDURAL; INFILTRATION; INTRACAUDAL; PERINEURAL at 09:08

## 2025-08-18 LAB — POCT GLUCOSE: 68 MG/DL (ref 70–110)

## 2025-08-19 ENCOUNTER — CLINICAL SUPPORT (OUTPATIENT)
Dept: BARIATRICS | Facility: HOSPITAL | Age: 35
End: 2025-08-19

## 2025-08-19 VITALS
SYSTOLIC BLOOD PRESSURE: 139 MMHG | RESPIRATION RATE: 16 BRPM | BODY MASS INDEX: 39.82 KG/M2 | HEIGHT: 65 IN | OXYGEN SATURATION: 98 % | DIASTOLIC BLOOD PRESSURE: 78 MMHG | HEART RATE: 72 BPM | WEIGHT: 239 LBS | TEMPERATURE: 98 F

## 2025-08-19 VITALS — WEIGHT: 244.31 LBS | HEIGHT: 65 IN | BODY MASS INDEX: 40.71 KG/M2

## 2025-08-19 DIAGNOSIS — E66.01 MORBID OBESITY WITH BMI OF 40.0-44.9, ADULT: Primary | ICD-10-CM

## 2025-08-21 ENCOUNTER — TELEPHONE (OUTPATIENT)
Dept: SURGERY | Facility: CLINIC | Age: 35
End: 2025-08-21
Payer: MEDICAID

## 2025-09-02 ENCOUNTER — CLINICAL SUPPORT (OUTPATIENT)
Dept: BARIATRICS | Facility: HOSPITAL | Age: 35
End: 2025-09-02

## 2025-09-02 ENCOUNTER — LAB VISIT (OUTPATIENT)
Dept: LAB | Facility: HOSPITAL | Age: 35
End: 2025-09-02
Attending: SURGERY
Payer: MEDICAID

## 2025-09-02 ENCOUNTER — OFFICE VISIT (OUTPATIENT)
Dept: SURGERY | Facility: CLINIC | Age: 35
End: 2025-09-02
Payer: MEDICAID

## 2025-09-02 VITALS
HEART RATE: 85 BPM | SYSTOLIC BLOOD PRESSURE: 132 MMHG | HEIGHT: 65 IN | DIASTOLIC BLOOD PRESSURE: 86 MMHG | WEIGHT: 238 LBS | BODY MASS INDEX: 39.65 KG/M2

## 2025-09-02 VITALS — BODY MASS INDEX: 40.24 KG/M2 | WEIGHT: 238.13 LBS

## 2025-09-02 DIAGNOSIS — E66.01 MORBID OBESITY WITH BMI OF 40.0-44.9, ADULT: Primary | ICD-10-CM

## 2025-09-02 DIAGNOSIS — Z01.818 PREOP EXAMINATION: ICD-10-CM

## 2025-09-02 LAB
ALBUMIN SERPL-MCNC: 4.2 G/DL (ref 3.5–5)
ALBUMIN/GLOB SERPL: 1 RATIO (ref 1.1–2)
ALP SERPL-CCNC: 93 UNIT/L (ref 40–150)
ALT SERPL-CCNC: 12 UNIT/L (ref 0–55)
ANION GAP SERPL CALC-SCNC: 9 MEQ/L
APTT PPP: 32.4 SECONDS (ref 23.2–33.7)
AST SERPL-CCNC: 15 UNIT/L (ref 11–45)
BASOPHILS # BLD AUTO: 0.02 X10(3)/MCL
BASOPHILS NFR BLD AUTO: 0.3 %
BILIRUB SERPL-MCNC: 0.4 MG/DL
BUN SERPL-MCNC: 19.6 MG/DL (ref 7–18.7)
CALCIUM SERPL-MCNC: 9.8 MG/DL (ref 8.4–10.2)
CHLORIDE SERPL-SCNC: 105 MMOL/L (ref 98–107)
CO2 SERPL-SCNC: 25 MMOL/L (ref 22–29)
CREAT SERPL-MCNC: 0.78 MG/DL (ref 0.55–1.02)
CREAT/UREA NIT SERPL: 25
EOSINOPHIL # BLD AUTO: 0.07 X10(3)/MCL (ref 0–0.9)
EOSINOPHIL NFR BLD AUTO: 0.9 %
ERYTHROCYTE [DISTWIDTH] IN BLOOD BY AUTOMATED COUNT: 12.9 % (ref 11.5–17)
GFR SERPLBLD CREATININE-BSD FMLA CKD-EPI: >60 ML/MIN/1.73/M2
GLOBULIN SER-MCNC: 4.2 GM/DL (ref 2.4–3.5)
GLUCOSE SERPL-MCNC: 85 MG/DL (ref 74–100)
HCT VFR BLD AUTO: 40 % (ref 37–47)
HGB BLD-MCNC: 13.8 G/DL (ref 12–16)
IMM GRANULOCYTES # BLD AUTO: 0.03 X10(3)/MCL (ref 0–0.04)
IMM GRANULOCYTES NFR BLD AUTO: 0.4 %
LYMPHOCYTES # BLD AUTO: 2.34 X10(3)/MCL (ref 0.6–4.6)
LYMPHOCYTES NFR BLD AUTO: 30 %
MCH RBC QN AUTO: 30.2 PG (ref 27–31)
MCHC RBC AUTO-ENTMCNC: 34.5 G/DL (ref 33–36)
MCV RBC AUTO: 87.5 FL (ref 80–94)
MONOCYTES # BLD AUTO: 0.72 X10(3)/MCL (ref 0.1–1.3)
MONOCYTES NFR BLD AUTO: 9.2 %
NEUTROPHILS # BLD AUTO: 4.63 X10(3)/MCL (ref 2.1–9.2)
NEUTROPHILS NFR BLD AUTO: 59.2 %
NRBC BLD AUTO-RTO: 0 %
PLATELET # BLD AUTO: 380 X10(3)/MCL (ref 130–400)
PMV BLD AUTO: 10.3 FL (ref 7.4–10.4)
POTASSIUM SERPL-SCNC: 3.9 MMOL/L (ref 3.5–5.1)
PROT SERPL-MCNC: 8.4 GM/DL (ref 6.4–8.3)
RBC # BLD AUTO: 4.57 X10(6)/MCL (ref 4.2–5.4)
SODIUM SERPL-SCNC: 139 MMOL/L (ref 136–145)
WBC # BLD AUTO: 7.81 X10(3)/MCL (ref 4.5–11.5)

## 2025-09-02 PROCEDURE — 3079F DIAST BP 80-89 MM HG: CPT | Mod: CPTII,,, | Performed by: SURGERY

## 2025-09-02 PROCEDURE — 3066F NEPHROPATHY DOC TX: CPT | Mod: CPTII,,, | Performed by: SURGERY

## 2025-09-02 PROCEDURE — 3061F NEG MICROALBUMINURIA REV: CPT | Mod: CPTII,,, | Performed by: SURGERY

## 2025-09-02 PROCEDURE — 3075F SYST BP GE 130 - 139MM HG: CPT | Mod: CPTII,,, | Performed by: SURGERY

## 2025-09-02 PROCEDURE — 36415 COLL VENOUS BLD VENIPUNCTURE: CPT

## 2025-09-02 PROCEDURE — 3044F HG A1C LEVEL LT 7.0%: CPT | Mod: CPTII,,, | Performed by: SURGERY

## 2025-09-02 PROCEDURE — 3008F BODY MASS INDEX DOCD: CPT | Mod: CPTII,,, | Performed by: SURGERY

## 2025-09-02 PROCEDURE — 85730 THROMBOPLASTIN TIME PARTIAL: CPT

## 2025-09-02 PROCEDURE — 99214 OFFICE O/P EST MOD 30 MIN: CPT | Mod: ,,, | Performed by: SURGERY

## 2025-09-02 PROCEDURE — 85025 COMPLETE CBC W/AUTO DIFF WBC: CPT

## 2025-09-02 PROCEDURE — 1159F MED LIST DOCD IN RCRD: CPT | Mod: CPTII,,, | Performed by: SURGERY

## 2025-09-02 PROCEDURE — 80053 COMPREHEN METABOLIC PANEL: CPT

## (undated) DEVICE — TUBING O2 FEMALE CONN 13FT

## (undated) DEVICE — SOL IRRI STRL WATER 1000ML

## (undated) DEVICE — ADAPTER DUAL NSL LUER M-M 7FT

## (undated) DEVICE — ADAPTER DBL MALE LL CLR POLY

## (undated) DEVICE — SPONGE COTTON TRAY 4X4IN

## (undated) DEVICE — JELLY SURGILUBE LUBE TUBE 2OZ